# Patient Record
(demographics unavailable — no encounter records)

---

## 2024-12-31 NOTE — HISTORY OF PRESENT ILLNESS
[8] : 8 [0] : 0 [Dull/Aching] : dull/aching [Localized] : localized [Intermittent] : intermittent [Student] : Work status: student [de-identified] : 12/31/24: 16 y/o Pt reports left shoulder pain since October pain started when playing baseball. then wrestling started and made pain worse. no specific injury/trauma. no prior hx. Pt goes to [facility]. He has been attending [employer] [facility] for the past month with no improvement in symptoms. [] : Post Surgical Visit: no [FreeTextEntry1] : Left shoulder [de-identified] : activity

## 2024-12-31 NOTE — ASSESSMENT
[FreeTextEntry1] : XR L shoulder reviewed, neg Recommned MRI L shoulder eval labral tear JOHN blevins after MRI

## 2024-12-31 NOTE — IMAGING
[de-identified] :  LEFT SHOULDER Inspection: No swelling.  Palpation: Tenderness is noted at the anterior shoulder.  Range of motion:  Full range of motion but with some pain. Strength: There is pain and discomfort with strength testing.  Neurological testing: motor and sensor intact distally. Ligament Stability and Special Tests:  Shoulder apprehension: pos Shoulder relocation: pos Obriens test: pos Biceps Active test: pos Larose Labral Shear: pos Impingement testing: neg Bárbara testing: pain Whipple: neg Cross Body Adduction: neg

## 2025-01-12 NOTE — RETURN TO WORK/SCHOOL
[FreeTextEntry1] :    Diagnosis: left shoulder labral tear   X: Patient may return to gym/sports Jan 07, 2025     X: Patient may return to full activities Jan 07, 2025     Sincerely, Griffin Carrillo MD Co- Chief Sports Medicine [facility] Medical Director [facility] and [facility]  [employer]

## 2025-01-12 NOTE — HISTORY OF PRESENT ILLNESS
[de-identified] : Consult on the left shoulder from GIANNI Castellano. Patient does not recall any direct injury but had been touring colleges for Baseball and wrestling and feels may be due to overuse. Started to notice pain in the shoulder following. Able to weight train and exercise. Using Ice and Stim with ATC

## 2025-01-12 NOTE — RETURN TO WORK/SCHOOL
[FreeTextEntry1] :    Diagnosis: left shoulder labral tear   X: Patient may return to gym/sports Jan 07, 2025     X: Patient may return to full activities Jan 07, 2025     Sincerely, Griffin Carrillo MD Co- Chief Sports Medicine [facility] Medical Director [facility] and [facility]  [employer]

## 2025-01-12 NOTE — PHYSICAL EXAM
[Left] : left shoulder [NL (0-180)] : full passive abduction 0-180 degrees [5 ___] : forward flexion 5[unfilled]/5 [5___] : internal rotation 5[unfilled]/5 [] : no atrophy

## 2025-01-12 NOTE — DATA REVIEWED
[MRI] : MRI [Left] : left [Shoulder] : shoulder [Report was reviewed and noted in the chart] : The report was reviewed and noted in the chart [I independently reviewed and interpreted images and report] : I independently reviewed and interpreted images and report [I reviewed the films/CD] : I reviewed the films/CD [FreeTextEntry1] : MRI left shoulder reveals evidence of posterior inferior labral tearing with associated para-labral cyst

## 2025-01-12 NOTE — HISTORY OF PRESENT ILLNESS
[de-identified] : Consult on the left shoulder from GIANNI Castellano. Patient does not recall any direct injury but had been touring colleges for Baseball and wrestling and feels may be due to overuse. Started to notice pain in the shoulder following. Able to weight train and exercise. Using Ice and Stim with ATC

## 2025-01-12 NOTE — DISCUSSION/SUMMARY
[de-identified] : I spoke with the urgent care provider, reviewed notes, and images.  The patients condition is acute.  Confounding medical conditions/concerns: None  Tests/Studies Independently Interpreted Today: MRI left shoulder reveals evidence of posterior inferior labral tearing with associated para-labral cyst.   We reviewed the MRI findings and discussed treatment options, both operative vs non-operative for the patient's labral tearing given his wrestling and baseball activity. Since this is the patients first report of shoulder instability, we will hold off on surgical intervention and proceed with conservative treatment modalities to include physical therapy, NSAIDs, ice, bracing, etc. Advised the patient that there is increased risk of requiring surgery if his shoulder dislocates. Any recurrent injury may result in further, possible permanent cartilage damage eventually requiring surgical intervention.  Ultimately, he may need to consider arthroscopic labral repair however, given his upcoming wrestling and baseball season the patient decides to defer. If it is no better over the summer, he will consider arthroscopy especially given perspective college commitment.   Start physical therapy to work on shoulder strengthening and stability.   The patient was advised to let pain guide the gradual advancement of activities. They have no activity restrictions at this time however, any activity restrictions that bother him he should avoid.    Prescribed the patient Motrin 600mgs and discussed risks of side effects and timing and management of medication. Side effects include but are not limited to gi ulcers and irritation, as well as kidney failure and bleeding issues.   Follow up in 6-8 weeks

## 2025-01-12 NOTE — DISCUSSION/SUMMARY
[de-identified] : I spoke with the urgent care provider, reviewed notes, and images.  The patients condition is acute.  Confounding medical conditions/concerns: None  Tests/Studies Independently Interpreted Today: MRI left shoulder reveals evidence of posterior inferior labral tearing with associated para-labral cyst.   We reviewed the MRI findings and discussed treatment options, both operative vs non-operative for the patient's labral tearing given his wrestling and baseball activity. Since this is the patients first report of shoulder instability, we will hold off on surgical intervention and proceed with conservative treatment modalities to include physical therapy, NSAIDs, ice, bracing, etc. Advised the patient that there is increased risk of requiring surgery if his shoulder dislocates. Any recurrent injury may result in further, possible permanent cartilage damage eventually requiring surgical intervention.  Ultimately, he may need to consider arthroscopic labral repair however, given his upcoming wrestling and baseball season the patient decides to defer. If it is no better over the summer, he will consider arthroscopy especially given perspective college commitment.   Start physical therapy to work on shoulder strengthening and stability.   The patient was advised to let pain guide the gradual advancement of activities. They have no activity restrictions at this time however, any activity restrictions that bother him he should avoid.    Prescribed the patient Motrin 600mgs and discussed risks of side effects and timing and management of medication. Side effects include but are not limited to gi ulcers and irritation, as well as kidney failure and bleeding issues.   Follow up in 6-8 weeks